# Patient Record
Sex: FEMALE | Race: WHITE | Employment: UNEMPLOYED | URBAN - METROPOLITAN AREA
[De-identification: names, ages, dates, MRNs, and addresses within clinical notes are randomized per-mention and may not be internally consistent; named-entity substitution may affect disease eponyms.]

---

## 2018-12-31 DIAGNOSIS — R10.2 PELVIC AND PERINEAL PAIN: Primary | ICD-10-CM

## 2019-01-02 ENCOUNTER — HOSPITAL ENCOUNTER (OUTPATIENT)
Dept: RADIOLOGY | Facility: HOSPITAL | Age: 54
Discharge: HOME | End: 2019-01-02
Attending: FAMILY MEDICINE
Payer: MEDICAID

## 2019-01-02 DIAGNOSIS — R10.2 PELVIC AND PERINEAL PAIN: ICD-10-CM

## 2019-01-02 PROCEDURE — 76830 US PELVIS COMP WITH TRANSVAG NON-OB (XPD): ICD-10-PCS | Mod: 26,,, | Performed by: RADIOLOGY

## 2019-01-02 PROCEDURE — 76856 US EXAM PELVIC COMPLETE: CPT | Mod: 26,,, | Performed by: RADIOLOGY

## 2019-01-02 PROCEDURE — 76856 US PELVIS COMP WITH TRANSVAG NON-OB (XPD): ICD-10-PCS | Mod: 26,,, | Performed by: RADIOLOGY

## 2019-01-02 PROCEDURE — 76830 TRANSVAGINAL US NON-OB: CPT | Mod: 26,,, | Performed by: RADIOLOGY

## 2019-01-02 PROCEDURE — 76830 TRANSVAGINAL US NON-OB: CPT | Mod: TC

## 2019-01-28 ENCOUNTER — OFFICE VISIT (OUTPATIENT)
Dept: OBSTETRICS AND GYNECOLOGY | Facility: CLINIC | Age: 54
End: 2019-01-28
Payer: MEDICAID

## 2019-01-28 VITALS — DIASTOLIC BLOOD PRESSURE: 70 MMHG | SYSTOLIC BLOOD PRESSURE: 120 MMHG | WEIGHT: 170.44 LBS

## 2019-01-28 DIAGNOSIS — R10.2 PELVIC PAIN IN FEMALE: Primary | ICD-10-CM

## 2019-01-28 PROCEDURE — 99213 OFFICE O/P EST LOW 20 MIN: CPT | Mod: PBBFAC,PO | Performed by: OBSTETRICS & GYNECOLOGY

## 2019-01-28 PROCEDURE — 88175 CYTOPATH C/V AUTO FLUID REDO: CPT

## 2019-01-28 PROCEDURE — 99203 PR OFFICE/OUTPT VISIT, NEW, LEVL III, 30-44 MIN: ICD-10-PCS | Mod: S$PBB,,, | Performed by: OBSTETRICS & GYNECOLOGY

## 2019-01-28 PROCEDURE — 99203 OFFICE O/P NEW LOW 30 MIN: CPT | Mod: S$PBB,,, | Performed by: OBSTETRICS & GYNECOLOGY

## 2019-01-28 PROCEDURE — 99999 PR PBB SHADOW E&M-EST. PATIENT-LVL III: ICD-10-PCS | Mod: PBBFAC,,, | Performed by: OBSTETRICS & GYNECOLOGY

## 2019-01-28 PROCEDURE — 87086 URINE CULTURE/COLONY COUNT: CPT

## 2019-01-28 PROCEDURE — 87491 CHLMYD TRACH DNA AMP PROBE: CPT

## 2019-01-28 PROCEDURE — 99999 PR PBB SHADOW E&M-EST. PATIENT-LVL III: CPT | Mod: PBBFAC,,, | Performed by: OBSTETRICS & GYNECOLOGY

## 2019-01-28 NOTE — PROGRESS NOTES
"54 yo female who presents for evaluation of pelvic pain x "several months".  Reports no pain this month, however.  Pain can come with rest, as well as with movement/walking.  Patient denies any recent sexual activity.  Denies h/o STDs.  She [place] her [place].  Tried to discontinue the pills a few months ago and reports that she felt like her hormones were all over the place. She also felt very angry.  Cycles come q month with OCPs. First day is the heaviest.    ROS:  GENERAL: Denies weight gain or weight loss. Feeling well overall.   SKIN: Denies rash or lesions.   HEAD: Denies head injury or headache.   NODES: Denies enlarged lymph nodes.   CHEST: Denies chest pain or shortness of breath.   CARDIOVASCULAR: Denies palpitations or left sided chest pain.   ABDOMEN:positive abdominal pain.   URINARY: No frequency, dysuria, hematuria, or burning on urination.  REPRODUCTIVE: See HPI.   BREASTS: denies pain, lumps, or nipple discharge.   HEMATOLOGIC: No easy bruisability or excessive bleeding.   MUSCULOSKELETAL: Denies joint pain or swelling.   NEUROLOGIC: Denies syncope or weakness.   PSYCHIATRIC: Denies depression, anxiety or mood swings.         PE:   Vitals: /70   Wt 77.3 kg (170 lb 6.7 oz)   LMP 01/03/2019   APPEARANCE: Well nourished, well developed, in no acute distress.  ABDOMEN: Soft. No tenderness or masses. No hepatosplenomegaly. No hernias.  PELVIC: Normal external female genitalia without lesions. Normal hair distribution. Adequate perineal body, normal urethral meatus. Vagina moist and well rugated without lesions or discharge. Cervix pink and without lesions. No significant cystocele or rectocele. Bimanual exam showed uterus normal size, shape, position, mobile and nontender. Adnexa without masses or tenderness. Urethra and bladder normal.      AP  Abdominal pain - none today  -ddx includes: GYN etiology, urine infection, Musculoskeletal problem, GI " etiology  -reviewed pelvic US with the patient - shows small uterine fibroids; normal appearing ovaries  -urine cx and urine GC/chl sent  -per patient, colonoscopy uptodate (completed 2 yrs ago) - recommend f/u with GI for now  -pap collected      F/u in 4 wks to readdress problem at that time;    mariajose alvarez MD

## 2019-01-28 NOTE — LETTER
January 28, 2019      Gualberto Ascencio NP  [address]  [address]  [facility] [place] [address]           [place] [facility]  [address], [address]  5th Floor [facility]  [place] [address]  Phone: [phone]          Patient: Shira Matute   MR Number: [id number]   YOB: 1965   Date of Visit: 1/28/2019       Dear Gualberto Ascencio:    Thank you for referring Shira Matute to me for evaluation. Attached you will find relevant portions of my assessment and plan of care.    If you have questions, please do not hesitate to call me. I look forward to following Shira Matute along with you.    Sincerely,    Shaggy Lebron MD    Enclosure  CC:  No Recipients    If you would like to receive this communication electronically, please contact [email or web address] or [phone] to request more information on [employer] Link access.    For providers and/or their staff who would like to refer a patient to [facility], please contact us through our one-stop-shop provider referral line, [facility], at [phone].    If you feel you have received this communication in error or would no longer like to receive these types of communications, please e-mail [email or web address]

## 2019-01-28 NOTE — PATIENT INSTRUCTIONS
Mari  [phone]  to schedule colonoscopy/endoscopy    Please contatct Dr. Estiven Nuñez [facility] at [phone] to schedule colonoscopy/endoscopy, located in [address]      Dr. [facility]er [facility]   [address]  [place] [address]  Phone: [phone]  Next to [facility]

## 2019-01-28 NOTE — LETTER
[place] [facility]  [address] [place] [address], [address]  5th Floor [facility]  [place] [address]  Phone: [phone] January 28, 2019     Patient: Shira Matute   YOB: 1965   Date of Visit: 1/28/2019       To Dr. Callahan:    Thank you for referring your patient to my clinic. Enclosed is a copy of my clinic note.    If you have any questions or concerns, please don't hesitate to contact my office.    Sincerely,        Shaggy Lebron MD

## 2019-01-29 LAB
C TRACH DNA SPEC QL NAA+PROBE: NOT DETECTED
N GONORRHOEA DNA SPEC QL NAA+PROBE: NOT DETECTED

## 2019-01-30 LAB — BACTERIA UR CULT: NORMAL

## 2019-02-01 ENCOUNTER — TELEPHONE (OUTPATIENT)
Dept: OBSTETRICS AND GYNECOLOGY | Facility: CLINIC | Age: 54
End: 2019-02-01

## 2019-02-01 NOTE — TELEPHONE ENCOUNTER
----- Message from Shaggy Lebron MD sent at 1/30/2019  5:52 PM CST -----  Inform patient that her vaginal swabs for STDs was negative for infections.

## 2019-02-01 NOTE — TELEPHONE ENCOUNTER
----- Message from Cirilo Babcock sent at 2/1/2019 10:07 AM CST -----  Contact: self/[phone]  She is returning Echo's call.

## 2019-02-25 ENCOUNTER — OFFICE VISIT (OUTPATIENT)
Dept: OBSTETRICS AND GYNECOLOGY | Facility: CLINIC | Age: 54
End: 2019-02-25
Payer: MEDICAID

## 2019-02-25 VITALS — WEIGHT: 170.63 LBS | DIASTOLIC BLOOD PRESSURE: 60 MMHG | SYSTOLIC BLOOD PRESSURE: 104 MMHG

## 2019-02-25 DIAGNOSIS — R10.2 PELVIC PAIN: Primary | ICD-10-CM

## 2019-02-25 PROCEDURE — 99212 PR OFFICE/OUTPT VISIT, EST, LEVL II, 10-19 MIN: ICD-10-PCS | Mod: S$PBB,,, | Performed by: OBSTETRICS & GYNECOLOGY

## 2019-02-25 PROCEDURE — 99212 OFFICE O/P EST SF 10 MIN: CPT | Mod: PBBFAC,PO | Performed by: OBSTETRICS & GYNECOLOGY

## 2019-02-25 PROCEDURE — 99212 OFFICE O/P EST SF 10 MIN: CPT | Mod: S$PBB,,, | Performed by: OBSTETRICS & GYNECOLOGY

## 2019-02-25 PROCEDURE — 99999 PR PBB SHADOW E&M-EST. PATIENT-LVL II: CPT | Mod: PBBFAC,,, | Performed by: OBSTETRICS & GYNECOLOGY

## 2019-02-25 PROCEDURE — 99999 PR PBB SHADOW E&M-EST. PATIENT-LVL II: ICD-10-PCS | Mod: PBBFAC,,, | Performed by: OBSTETRICS & GYNECOLOGY

## 2019-02-25 NOTE — PROGRESS NOTES
"52 yo female who presents for evaluation of pelvic pain x "several months".  No pain since her last visit here.  No other gyn concerns.  She is still [place] to regulate her cycle.  Tried to discontinue the pills a few months ago and reports that she felt like her hormones were all over the place. She also felt very angry.  Cycles come q month with OCPs. First day is the heaviest.    ROS: negative    PE:   Vitals: /60   Wt 77.4 kg (170 lb 10.2 oz)   LMP 01/28/2019 (Approximate)   APPEARANCE: Well nourished, well developed, in no acute distress.  Deferred    AP  Abdominal pain - none today  -reviewed pelvic US with the patient - shows small uterine fibroids; normal appearing ovaries  -pap uptodate    F/u in jan/feb 2020 - will discuss cessation of OCPs at that visit    mariajose alvarez MD            "

## 2019-10-28 ENCOUNTER — TELEPHONE (OUTPATIENT)
Dept: OBSTETRICS AND GYNECOLOGY | Facility: CLINIC | Age: 54
End: 2019-10-28

## 2019-10-28 NOTE — TELEPHONE ENCOUNTER
Pt states she's been having abnormal bleeding for two weeks. With no other symptoms. Please Advise    Pt wanted to be seen today or tomorrow. Advised pt the n/a  wasn't until 11/29/19.    Pt didn't want to wait - pt is ok with seeing another female provider. I scheduled her with Dr. Man for next week.  But pt still requested that message be sent to the doctor.

## 2019-10-28 NOTE — TELEPHONE ENCOUNTER
----- Message from Catarina Narayanan sent at 10/28/2019 10:27 AM CDT -----  Contact: Patient   Patient is requesting an appointment for today regarding irregular bleeding for 2 weeks       [phone]

## 2020-02-17 ENCOUNTER — TELEPHONE (OUTPATIENT)
Dept: OBSTETRICS AND GYNECOLOGY | Facility: CLINIC | Age: 55
End: 2020-02-17

## 2020-06-29 ENCOUNTER — TELEPHONE (OUTPATIENT)
Dept: OBSTETRICS AND GYNECOLOGY | Facility: CLINIC | Age: 55
End: 2020-06-29

## 2021-08-08 ENCOUNTER — CLINICAL SUPPORT (OUTPATIENT)
Dept: URGENT CARE | Facility: CLINIC | Age: 56
End: 2021-08-08
Payer: MEDICAID

## 2021-08-08 DIAGNOSIS — Z20.822 ENCOUNTER FOR LABORATORY TESTING FOR COVID-19 VIRUS: ICD-10-CM

## 2021-08-08 PROCEDURE — U0005 INFEC AGEN DETEC AMPLI PROBE: HCPCS | Performed by: FAMILY MEDICINE

## 2021-08-08 PROCEDURE — U0003 INFECTIOUS AGENT DETECTION BY NUCLEIC ACID (DNA OR RNA); SEVERE ACUTE RESPIRATORY SYNDROME CORONAVIRUS 2 (SARS-COV-2) (CORONAVIRUS DISEASE [COVID-19]), AMPLIFIED PROBE TECHNIQUE, MAKING USE OF HIGH THROUGHPUT TECHNOLOGIES AS DESCRIBED BY CMS-2020-01-R: HCPCS | Performed by: FAMILY MEDICINE

## 2021-08-09 ENCOUNTER — TELEPHONE (OUTPATIENT)
Dept: URGENT CARE | Facility: CLINIC | Age: 56
End: 2021-08-09

## 2021-08-09 LAB — SARS-COV-2 RNA RESP QL NAA+PROBE: NOT DETECTED

## 2024-01-31 ENCOUNTER — PATIENT MESSAGE (OUTPATIENT)
Dept: OBSTETRICS AND GYNECOLOGY | Facility: CLINIC | Age: 59
End: 2024-01-31
Payer: MEDICAID